# Patient Record
Sex: FEMALE | Race: WHITE | NOT HISPANIC OR LATINO | Employment: FULL TIME | URBAN - METROPOLITAN AREA
[De-identification: names, ages, dates, MRNs, and addresses within clinical notes are randomized per-mention and may not be internally consistent; named-entity substitution may affect disease eponyms.]

---

## 2021-01-28 ENCOUNTER — IMMUNIZATION (OUTPATIENT)
Dept: NURSING | Facility: CLINIC | Age: 36
End: 2021-01-28
Payer: COMMERCIAL

## 2021-01-28 PROCEDURE — 91300 PR COVID VAC PFIZER DIL RECON 30 MCG/0.3 ML IM: CPT

## 2021-01-28 PROCEDURE — 0001A PR COVID VAC PFIZER DIL RECON 30 MCG/0.3 ML IM: CPT

## 2021-02-18 ENCOUNTER — IMMUNIZATION (OUTPATIENT)
Dept: NURSING | Facility: CLINIC | Age: 36
End: 2021-02-18
Payer: COMMERCIAL

## 2021-02-18 PROCEDURE — 91300 PR COVID VAC PFIZER DIL RECON 30 MCG/0.3 ML IM: CPT

## 2021-02-18 PROCEDURE — 0002A PR COVID VAC PFIZER DIL RECON 30 MCG/0.3 ML IM: CPT

## 2021-03-07 ENCOUNTER — HEALTH MAINTENANCE LETTER (OUTPATIENT)
Age: 36
End: 2021-03-07

## 2021-10-11 ENCOUNTER — HEALTH MAINTENANCE LETTER (OUTPATIENT)
Age: 36
End: 2021-10-11

## 2022-03-27 ENCOUNTER — HEALTH MAINTENANCE LETTER (OUTPATIENT)
Age: 37
End: 2022-03-27

## 2022-09-25 ENCOUNTER — HEALTH MAINTENANCE LETTER (OUTPATIENT)
Age: 37
End: 2022-09-25

## 2023-05-08 ENCOUNTER — HEALTH MAINTENANCE LETTER (OUTPATIENT)
Age: 38
End: 2023-05-08

## 2023-12-08 ENCOUNTER — ANCILLARY PROCEDURE (OUTPATIENT)
Dept: ULTRASOUND IMAGING | Facility: CLINIC | Age: 38
End: 2023-12-08
Attending: STUDENT IN AN ORGANIZED HEALTH CARE EDUCATION/TRAINING PROGRAM
Payer: COMMERCIAL

## 2023-12-08 ENCOUNTER — NURSE TRIAGE (OUTPATIENT)
Dept: NURSING | Facility: CLINIC | Age: 38
End: 2023-12-08

## 2023-12-08 ENCOUNTER — HOSPITAL ENCOUNTER (EMERGENCY)
Facility: CLINIC | Age: 38
Discharge: HOME | End: 2023-12-08
Admitting: EMERGENCY MEDICINE
Payer: COMMERCIAL

## 2023-12-08 ENCOUNTER — HOSPITAL ENCOUNTER (EMERGENCY)
Facility: CLINIC | Age: 38
Discharge: HOME | End: 2023-12-08
Attending: STUDENT IN AN ORGANIZED HEALTH CARE EDUCATION/TRAINING PROGRAM | Admitting: STUDENT IN AN ORGANIZED HEALTH CARE EDUCATION/TRAINING PROGRAM
Payer: COMMERCIAL

## 2023-12-08 ENCOUNTER — TELEPHONE (OUTPATIENT)
Dept: FAMILY MEDICINE | Facility: CLINIC | Age: 38
End: 2023-12-08

## 2023-12-08 VITALS
SYSTOLIC BLOOD PRESSURE: 159 MMHG | DIASTOLIC BLOOD PRESSURE: 89 MMHG | OXYGEN SATURATION: 99 % | TEMPERATURE: 98.8 F | WEIGHT: 130 LBS | RESPIRATION RATE: 18 BRPM | BODY MASS INDEX: 21.66 KG/M2 | HEART RATE: 97 BPM | HEIGHT: 65 IN

## 2023-12-08 VITALS
WEIGHT: 147 LBS | DIASTOLIC BLOOD PRESSURE: 81 MMHG | HEART RATE: 86 BPM | RESPIRATION RATE: 16 BRPM | HEIGHT: 65 IN | BODY MASS INDEX: 24.49 KG/M2 | TEMPERATURE: 98.7 F | SYSTOLIC BLOOD PRESSURE: 136 MMHG | OXYGEN SATURATION: 100 %

## 2023-12-08 DIAGNOSIS — R22.0 LIP SWELLING: ICD-10-CM

## 2023-12-08 LAB
ANION GAP SERPL CALCULATED.3IONS-SCNC: 14 MMOL/L (ref 7–15)
BUN SERPL-MCNC: 12.6 MG/DL (ref 6–20)
CALCIUM SERPL-MCNC: 9.9 MG/DL (ref 8.6–10)
CHLORIDE SERPL-SCNC: 104 MMOL/L (ref 98–107)
CREAT SERPL-MCNC: 0.8 MG/DL (ref 0.51–0.95)
DEPRECATED HCO3 PLAS-SCNC: 24 MMOL/L (ref 22–29)
EGFRCR SERPLBLD CKD-EPI 2021: >90 ML/MIN/1.73M2
ERYTHROCYTE [DISTWIDTH] IN BLOOD BY AUTOMATED COUNT: 11.6 % (ref 10–15)
GLUCOSE SERPL-MCNC: 126 MG/DL (ref 70–99)
HCG SERPL QL: NEGATIVE
HCT VFR BLD AUTO: 36.7 % (ref 35–47)
HGB BLD-MCNC: 12.7 G/DL (ref 11.7–15.7)
MCH RBC QN AUTO: 30.6 PG (ref 26.5–33)
MCHC RBC AUTO-ENTMCNC: 34.6 G/DL (ref 31.5–36.5)
MCV RBC AUTO: 88 FL (ref 78–100)
PLATELET # BLD AUTO: 258 10E3/UL (ref 150–450)
POTASSIUM SERPL-SCNC: 3.3 MMOL/L (ref 3.4–5.3)
RBC # BLD AUTO: 4.15 10E6/UL (ref 3.8–5.2)
SODIUM SERPL-SCNC: 142 MMOL/L (ref 135–145)
WBC # BLD AUTO: 10.5 10E3/UL (ref 4–11)

## 2023-12-08 PROCEDURE — 99285 EMERGENCY DEPT VISIT HI MDM: CPT | Mod: 25

## 2023-12-08 PROCEDURE — 36415 COLL VENOUS BLD VENIPUNCTURE: CPT | Performed by: STUDENT IN AN ORGANIZED HEALTH CARE EDUCATION/TRAINING PROGRAM

## 2023-12-08 PROCEDURE — 85027 COMPLETE CBC AUTOMATED: CPT | Performed by: STUDENT IN AN ORGANIZED HEALTH CARE EDUCATION/TRAINING PROGRAM

## 2023-12-08 PROCEDURE — 76536 US EXAM OF HEAD AND NECK: CPT

## 2023-12-08 PROCEDURE — 80048 BASIC METABOLIC PNL TOTAL CA: CPT | Performed by: STUDENT IN AN ORGANIZED HEALTH CARE EDUCATION/TRAINING PROGRAM

## 2023-12-08 PROCEDURE — 250N000011 HC RX IP 250 OP 636: Mod: JZ | Performed by: STUDENT IN AN ORGANIZED HEALTH CARE EDUCATION/TRAINING PROGRAM

## 2023-12-08 PROCEDURE — 99282 EMERGENCY DEPT VISIT SF MDM: CPT | Mod: 25,27

## 2023-12-08 PROCEDURE — 96374 THER/PROPH/DIAG INJ IV PUSH: CPT | Mod: 59

## 2023-12-08 PROCEDURE — 250N000013 HC RX MED GY IP 250 OP 250 PS 637: Performed by: STUDENT IN AN ORGANIZED HEALTH CARE EDUCATION/TRAINING PROGRAM

## 2023-12-08 PROCEDURE — 64400 NJX AA&/STRD TRIGEMINAL NRV: CPT

## 2023-12-08 PROCEDURE — 96375 TX/PRO/DX INJ NEW DRUG ADDON: CPT | Mod: 59

## 2023-12-08 PROCEDURE — 84703 CHORIONIC GONADOTROPIN ASSAY: CPT | Performed by: STUDENT IN AN ORGANIZED HEALTH CARE EDUCATION/TRAINING PROGRAM

## 2023-12-08 RX ORDER — CEPHALEXIN 500 MG/1
500 CAPSULE ORAL 4 TIMES DAILY
Qty: 27 CAPSULE | Refills: 0 | Status: SHIPPED | OUTPATIENT
Start: 2023-12-08 | End: 2023-12-15

## 2023-12-08 RX ORDER — PREDNISONE 20 MG/1
TABLET ORAL
Qty: 10 TABLET | Refills: 0 | Status: SHIPPED | OUTPATIENT
Start: 2023-12-08

## 2023-12-08 RX ORDER — ETONOGESTREL AND ETHINYL ESTRADIOL VAGINAL RING .015; .12 MG/D; MG/D
RING VAGINAL
COMMUNITY
Start: 2023-09-01

## 2023-12-08 RX ORDER — METHYLPREDNISOLONE SODIUM SUCCINATE 125 MG/2ML
125 INJECTION, POWDER, LYOPHILIZED, FOR SOLUTION INTRAMUSCULAR; INTRAVENOUS ONCE
Status: COMPLETED | OUTPATIENT
Start: 2023-12-08 | End: 2023-12-08

## 2023-12-08 RX ORDER — CEPHALEXIN 500 MG/1
500 CAPSULE ORAL ONCE
Status: COMPLETED | OUTPATIENT
Start: 2023-12-08 | End: 2023-12-08

## 2023-12-08 RX ORDER — EPINEPHRINE 0.3 MG/.3ML
0.3 INJECTION SUBCUTANEOUS
Qty: 2 EACH | Refills: 0 | Status: SHIPPED | OUTPATIENT
Start: 2023-12-08

## 2023-12-08 RX ORDER — DIPHENHYDRAMINE HYDROCHLORIDE 50 MG/ML
50 INJECTION INTRAMUSCULAR; INTRAVENOUS ONCE
Status: COMPLETED | OUTPATIENT
Start: 2023-12-08 | End: 2023-12-08

## 2023-12-08 RX ADMIN — DIPHENHYDRAMINE HYDROCHLORIDE 50 MG: 50 INJECTION, SOLUTION INTRAMUSCULAR; INTRAVENOUS at 01:30

## 2023-12-08 RX ADMIN — CEPHALEXIN 500 MG: 500 CAPSULE ORAL at 02:46

## 2023-12-08 RX ADMIN — METHYLPREDNISOLONE SODIUM SUCCINATE 125 MG: 125 INJECTION, POWDER, FOR SOLUTION INTRAMUSCULAR; INTRAVENOUS at 01:36

## 2023-12-08 ASSESSMENT — ENCOUNTER SYMPTOMS
TROUBLE SWALLOWING: 0
SHORTNESS OF BREATH: 0

## 2023-12-08 ASSESSMENT — ACTIVITIES OF DAILY LIVING (ADL): ADLS_ACUITY_SCORE: 35

## 2023-12-08 NOTE — ED TRIAGE NOTES
Pt presents with concerns due to changes in character of lip swelling. Was seen this morning. Lip swelling has stayed the same but her lip is now white and feels calloused. Denies any increase in swelling.      Triage Assessment (Adult)       Row Name 12/08/23 2439          Triage Assessment    Airway WDL WDL        Respiratory WDL    Respiratory WDL WDL        Skin Circulation/Temperature WDL    Skin Circulation/Temperature WDL WDL        Cardiac WDL    Cardiac WDL WDL        Peripheral/Neurovascular WDL    Peripheral Neurovascular WDL WDL        Cognitive/Neuro/Behavioral WDL    Cognitive/Neuro/Behavioral WDL WDL

## 2023-12-08 NOTE — ED PROVIDER NOTES
NAME: Sarah Harrison  AGE: 38 year old female  YOB: 1985  MRN: [id number]  EVALUATION DATE & TIME: 12/8/2023  1:15 AM    PCP: [facility]  ED PROVIDER: Bhargavi Oliver MD.    Chief Complaint   Patient presents with    Oral Swelling       FINAL IMPRESSION:  1. Lip swelling        MEDICAL DECISION MAKING:      MDM: 39 y/o F who presents with lip swelling. Developed a pimple/sore to her left lower lip that progressed to focal area of swelling this evening. Her vitals are reassuring and she is nontoxic appearing. She was given benadryl, steroids, antibiotics. Ultrasound and exam is not suggestive of abscess. Lab work is generally reassuring, mild hypokalemia noted.  I do not suspect sepsis or feel warrants CT imaging at this time. I considered anaphylaxis or angioedema - no new medications, ace/arb, no other symptoms and exam and presentation seems atypical for this given how localized it is (only part of the lower lip and started with a sore/bump). Will send with an epi pen out of abundance of caution. Plan to start on antibiotics and steroids with close follow up early next week for reassessment. Strict return precautions discussed and patient is in agreement with plan, endorses understanding and her questions were answered.    Medical Decision Making    History:  Supplemental history from: Documented in chart, if applicable  External Record(s) reviewed: Documented in chart, if applicable.    Work Up:  Chart documentation includes differential considered and any EKGs or imaging interpreted by provider.  In additional to work up documented, I considered the following work up: Documented in chart, if applicable.    External consultation:  Discussion of management with another provider: Documented in chart, if applicable    Complicating factors:  Care impacted by chronic illness: N/A  Care affected by social determinants of health: N/A    Disposition considerations: Discharge. I prescribed  additional prescription strength medication(s) as charted. I considered admission, but ultimately discharged patient given reassuring vitals, labs.      MEDICATIONS GIVEN IN THE EMERGENCY:  Medications   diphenhydrAMINE (BENADRYL) injection 50 mg (50 mg Intravenous $Given 12/8/23 0130)   methylPREDNISolone sodium succinate (solu-MEDROL) injection 125 mg (125 mg Intravenous $Given 12/8/23 0136)   cephALEXin (KEFLEX) capsule 500 mg (500 mg Oral $Given 12/8/23 0246)       NEW PRESCRIPTIONS STARTED AT TODAY'S ER VISIT:  Discharge Medication List as of 12/8/2023  3:01 AM        START taking these medications    Details   cephALEXin (KEFLEX) 500 MG capsule Take 1 capsule (500 mg) by mouth 4 times daily for 7 days, Disp-27 capsule, R-0, Local Print      EPINEPHrine (ANY BX GENERIC EQUIV) 0.3 MG/0.3ML injection 2-pack Inject 0.3 mLs (0.3 mg) into the muscle once as needed for anaphylaxis May repeat one time in 5-15 minutes if response to initial dose is inadequate., Disp-2 each, R-0, Local Print      predniSONE (DELTASONE) 20 MG tablet Take two tablets (= 40mg) each day for 5 (five) days, Disp-10 tablet, R-0, Local Print              =================================================================  HPI    Patient information was obtained from: patient and mother  Use of : N/A      Sarah RENNY Harrison is a 38 year old female who presents with lip swelling. Recently noticed a sore/pimple to her left lower lip. This developed this evening into increased swelling and tingling. No new oral or topical medications. No h/o of allergic reaction. Is not on an ace/arb. No tongue swelling, throat tightness, chest pain, difficulty breathing or swallowing, vomiting, abdominal pain, diarrhea or other symptoms. She did have lip fillers in August of this year at a [facility].     PAST MEDICAL HISTORY:  No past medical history on file.    PAST SURGICAL HISTORY:  No past surgical history on file.    CURRENT MEDICATIONS:    No current  "facility-administered medications for this encounter.    Current Outpatient Medications:     cephALEXin (KEFLEX) 500 MG capsule, Take 1 capsule (500 mg) by mouth 4 times daily for 7 days, Disp: 27 capsule, Rfl: 0    EPINEPHrine (ANY BX GENERIC EQUIV) 0.3 MG/0.3ML injection 2-pack, Inject 0.3 mLs (0.3 mg) into the muscle once as needed for anaphylaxis May repeat one time in 5-15 minutes if response to initial dose is inadequate., Disp: 2 each, Rfl: 0    etonogestrel-ethinyl estradiol (NUVARING) 0.12-0.015 MG/24HR vaginal ring, Insert 1 ring vaginally and leave in place for 3 consecutive weeks, then remove for 1 week. Repeat with new ring., Disp: , Rfl:     predniSONE (DELTASONE) 20 MG tablet, Take two tablets (= 40mg) each day for 5 (five) days, Disp: 10 tablet, Rfl: 0    ALLERGIES:  No Known Allergies    FAMILY HISTORY:  No family history on file.    SOCIAL HISTORY:   Social History     Socioeconomic History    Marital status: Single       PHYSICAL EXAM:    Vitals: /81   Pulse 86   Temp 98.7  F (37.1  C) (Oral)   Resp 16   Ht 1.651 m (5' 5\")   Wt 66.7 kg (147 lb)   SpO2 100%   BMI 24.46 kg/m     Constitutional: Well developed, well nourished.  HENT: Localized swelling to the left lower lateral lip with small open wound anteriorly. Floor of both/gums without swelling. Head, normocephalic, atraumatic. Neck-gross ROM intact.   Eyes: Pupils mid-range, sclera white  Respiratory: no respiratory distress  Cardiovascular: Normal heart rate  Musculoskeletal: Moving extremities intentionally and without pain. No obvious deformity.  Skin: Warm, dry, no rash.  Neurologic: Alert & oriented, speech clear, Cns grossly intact, no focal deficits noted    LAB:  All pertinent labs reviewed and interpreted.  Labs Ordered and Resulted from Time of ED Arrival to Time of ED Departure   BASIC METABOLIC PANEL - Abnormal       Result Value    Sodium 142      Potassium 3.3 (*)     Chloride 104      Carbon Dioxide (CO2) 24      " Anion Gap 14      Urea Nitrogen 12.6      Creatinine 0.80      GFR Estimate >90      Calcium 9.9      Glucose 126 (*)    CBC WITH PLATELETS - Normal    WBC Count 10.5      RBC Count 4.15      Hemoglobin 12.7      Hematocrit 36.7      MCV 88      MCH 30.6      MCHC 34.6      RDW 11.6      Platelet Count 258     HCG QUALITATIVE PREGNANCY - Normal    hCG Serum Qualitative Negative         EKG:   N/A    PROCEDURES:   POC US SOFT TISSUE    Date/Time: 12/8/2023 3:48 AM    Performed by: Bhargavi Oliver MD  Authorized by: Bhargavi Oliver MD    Procedure Details & Findings:      PROCEDURE PROVIDER:   Bhargavi Oliver  FINDINGS: Some cobblestoning. No abscess or fluid collection seen  IMAGES SAVED AND STORED FOR ARCHIVE AND REVIEW: Yes       PROCEDURE: Dental Nerve Block   INDICATIONS: Dental pain   PROCEDURE PROVIDER: Dr Bhargavi Oliver   SITE: Left mental nerve     MEDICATION: 3 mL of 1% Lidocaine without epinephrine   NOTE: The usual landmarks were identified and the needle was positioned intraorally near 2nd premolar tooth.  Area was aspirated and there was no return of blood.  I then injected the medication into the site.    COMPLICATIONS: Patient tolerated procedure well, without complication       Bhargavi Oliver M.D.  Emergency Medicine  [facility] EMERGENCY ROOM  [address] [id number] [phone]  Dept: [phone]       Bhargavi Oliver MD  12/08/23 0357

## 2023-12-08 NOTE — ED NOTES
"Pt presents to ED with lower lip swelling that started yesterday late afternoon, around 1600.  Pt took 4  benadryl around that time.  Notes she had a \"pimple\" to the area and was picking at it.  Had some swelling at that time, but it was \"just a little bit\".  Denies any throat swelling or tightness.  States her lip feels harder now.   "

## 2023-12-08 NOTE — DISCHARGE INSTRUCTIONS
Please take antibiotics and steroids as prescribed   Follow up closely with a primary care doctor ideally on Monday for reassessment, I would start with them instead of a specialist, they can refer you if needed  If you have significant increased swelling, tongue/throat swelling, difficulty breathing or swallowing take the epi pen and call 911/return to the Emergency Room. Also return with fevers

## 2023-12-08 NOTE — TELEPHONE ENCOUNTER
Nurse Triage SBAR    Is this a 2nd Level Triage? NO    Situation: New Lip numbness and lip color change    Background: Pt. Was seen in [facility] ER last night for Lip sore/swelling.   Pt. Has taken 1 dose of prescribed Keflex, next dose of oral steroid not due until Sat. Am as stteroid was started in  ER. Pt. Reports skin color has changed to pale/brown.     Assessment: Pt. Denies pain and further lip swelling. Denies difficulty breathing, tongue swelling or hives. Protocol Recommended Disposition:   Go to ED Now (Or PCP Triage), See More Appropriate Guideline    Recommendation: Triager reviewed indications for epi-pen use and if symptoms progress en route to ER              Reason for Disposition   Patient sounds very sick or weak to the triager     Triager unsure if possibility could be a delayed reaction to nerve block/Lidocaine, pt. Notes new brown and pale color, non specific description of site. She denied pain, but did state area is more Numb now than when administered   Lip swelling is main symptom    Additional Information   Negative: Unresponsive, passed out or very weak   Negative: Swollen tongue   Negative: Difficulty breathing or wheezing   Negative: [1] Life-threatening reaction in the past to similar substance (e.g., food, insect bite/sting, chemical, etc.) AND [2] < 2 hours since exposure   Negative: Sounds like a life-threatening emergency to the triager   Negative: Taking an ACE Inhibitor medicine (e.g., benazepril / LOTENSIN, captopril / CAPOTEN, enalapril / VASOTEC, lisinopril / ZESTRIL)   Negative: [1] Severe swelling AND [2] cause unknown     Seen in Er at 0100 and evaluated, no increase in swelling now in comparison    Protocols used: Allergic Reactions - Guideline [id number], Lip Swelling-A-

## 2023-12-08 NOTE — TELEPHONE ENCOUNTER
Reason for call:  Other   Patient called regarding (reason for call):   ED follow up 1- 2 Days for Lip Swelling / Establish care     Additional comments:   PT seeking any available provider at [facility] but [facility], [facility], [facility], [facility] are ok too. Please reach out to patient and thank you.    Phone number to reach patient:  Home number on file [phone] (home)    Best Time:  any    Can we leave a detailed message on this number?  YES    Travel screening: Not Applicable

## 2023-12-08 NOTE — ED TRIAGE NOTES
Patient here for lower lip swelling that started tonight. Patient reports she had a pimple in that area and started to pick the area tonight. Swelling got worse and spread throughout the entire lip. Denies allergens or blood pressure medication use. Also reports throat tightness. No signs of respiratory distress.      Triage Assessment (Adult)       Row Name 12/08/23 0119          Triage Assessment    Airway WDL WDL        Respiratory WDL    Respiratory WDL WDL        Skin Circulation/Temperature WDL    Skin Circulation/Temperature WDL X  swelling to lower lip        Cardiac WDL    Cardiac WDL WDL        Peripheral/Neurovascular WDL    Peripheral Neurovascular WDL WDL        Cognitive/Neuro/Behavioral WDL    Cognitive/Neuro/Behavioral WDL WDL

## 2023-12-09 NOTE — ED NOTES
Called for pt in lobby three separate times. No response from lobby. Pt presumed to have left prior to received discharge instructions from RN.

## 2023-12-09 NOTE — DISCHARGE INSTRUCTIONS
You were seen here today for evaluation of your lip swelling.  Your lip appears to still have very good blood flow which is our main concern with any discoloration.    Continue taking the Keflex and prednisone as previously prescribed.  I would also recommend applying a cool compress to your lip to help with the swelling.    Use the epipen if you have worsening swelling, difficulty breathing, vomiting. You should always come to the er if you need to use an epipen.     Return here for any new or worsening symptoms otherwise follow up with your primary care provider as planned.

## 2023-12-09 NOTE — ED PROVIDER NOTES
EMERGENCY DEPARTMENT ENCOUNTER      NAME: Sarah Harrison  AGE: 38 year old female  YOB: 1985  MRN: [id number]  EVALUATION DATE & TIME: No admission date for patient encounter.    PCP: [facility]    ED PROVIDER: Onelia Valentin PA-C      Chief Complaint   Patient presents with    Oral Swelling         FINAL IMPRESSION:  1. Lip swelling          ED COURSE & MEDICAL DECISION MAKING:    Pertinent Labs & Imaging studies reviewed. (See chart for details)    38 year old female presents to the Emergency Department for evaluation of lip swelling.    Physical exam is remarkable for a generally well-appearing female who is in no acute distress.  She has swelling of her left lower lip, there is some white demarcation on the upper part of the lower lip but capillary refill is less than 1 second in the entirety of the lip.  She has normal range of motion of the lip, no trismus.  No swelling on the floor of the mouth or the tongue.  Normal range of motion of the neck.  Vital signs are stable and she is afebrile.    I do not think any emergent labs or imaging are indicated at this time.  The patient is hemodynamically stable here and generally well-appearing on exam.  She believes her lip swelling is actually improved slightly compared to her previous visit but was concerned about the discoloration, I advised her that she is neurovascularly intact so I do not have concern about the discoloration and there is no clinical evidence of vascular compromise. Patient was hopeful that we could possibly drain the area but there is no focal fluid collection/abscess that would be amenable to drainage. No evidence of anaphylaxis or significant allergic reaction at this point.  Advised her to continue the prednisone and Keflex at home, also discussed cool compresses. Discussed indications to use the epipen. Recommend Tylenol or Advil as well.  Advised to return here for any new or worsening symptoms otherwise  follow-up in primary care clinic as planned.  Patient is agreeable with this treatment plan and verbalized understanding.    Medical Decision Making    History:  Supplemental history from: Documented in chart, if applicable  External Record(s) reviewed: Outpatient Record: ER visit earlier today    Work Up:  Chart documentation includes differential considered and any EKGs or imaging independently interpreted by provider, where specified.  In additional to work up documented, I considered the following work up: Imaging CT, but deferred due to no clinical decline.    External consultation:  Discussion of management with another provider: Documented in chart, if applicable    Complicating factors:  Care impacted by chronic illness: N/A  Care affected by social determinants of health: N/A    Disposition considerations: Discharge. I recommended the patient continue their current prescription strength medication(s): keflex and prednisone. N/A.    ED Course   6:40 PM Performed my initial history and physical exam. Discussed workup in the emergency department, management of symptoms, and likely disposition. I discussed the plan for discharge with the patient or family and they are agreeable.. We discussed supportive cares at home and reasons for return to the ER including new or worsening symptoms - all questions and concerns addressed. Patient to be discharged by RN.    At the conclusion of the encounter I discussed the results of all of the tests and the disposition. The questions were answered. The patient or family acknowledged understanding and was agreeable with the care plan.     Voice recognition software was used in the creation of this note. Any grammatical or nonsensical errors are due to inherent errors with the software and are not the intention of the writer.     MEDICATIONS GIVEN IN THE EMERGENCY:  Medications - No data to display    NEW PRESCRIPTIONS STARTED AT TODAY'S ER VISIT  New Prescriptions    No  medications on file            =================================================================    HPI    Patient information was obtained from: patient     Use of : N/A         Sarah Harrison is a 38 year old female who presents to the ED for evaluation of lip swelling.     Per chart review,   Patient was seen in the  ED earlier today for left lower lip swelling. She was given benadryl, steroids, antibiotics. Ultrasound and exam not suggestive of abscess. Lab work notable for mild hypokalemia, but was otherwise reassuring. Discharged with course of Keflex and prednisone. Also prescribed an epi-pen.     The patient returns to the ED for ongoing lower left lip swelling. Previously the area was more red, but now it is closer to a white-pink color. The swelling has improved a bit. She has ongoing pain to the area. She has taken the antibiotic and prednisone since she was last seen in the ED. She was concerned about the change in coloration with her ongoing swelling and pain. She is hoping to have the area drained. No associated swelling to her tongue or floor of mouth. No associated throat swelling or shortness of breath. No other reported complaints or concerns at this time. She had upper and lower lip fillers placed in August.       REVIEW OF SYSTEMS   Review of Systems   HENT:  Negative for trouble swallowing.         Positive for left lower lip swelling, discoloration, and pain. No tongue/floor of mouth swelling.     Respiratory:  Negative for shortness of breath.      All other systems reviewed and are negative unless noted in HPI.      PAST MEDICAL HISTORY:  History reviewed. No pertinent past medical history.    PAST SURGICAL HISTORY:  History reviewed. No pertinent surgical history.    CURRENT MEDICATIONS:    cephALEXin (KEFLEX) 500 MG capsule  EPINEPHrine (ANY BX GENERIC EQUIV) 0.3 MG/0.3ML injection 2-pack  etonogestrel-ethinyl estradiol (NUVARING) 0.12-0.015 MG/24HR vaginal ring  predniSONE  "(DELTASONE) 20 MG tablet        ALLERGIES:  No Known Allergies    FAMILY HISTORY:  History reviewed. No pertinent family history.    SOCIAL HISTORY:   Social History     Socioeconomic History    Marital status: Single       VITALS:  Patient Vitals for the past 24 hrs:   BP Temp Temp src Pulse Resp SpO2 Height Weight   12/08/23 1651 (!) 159/89 98.8  F (37.1  C) Oral 97 18 99 % 1.651 m (5' 5\") 59 kg (130 lb)       PHYSICAL EXAM    VITAL SIGNS: BP (!) 159/89   Pulse 97   Temp 98.8  F (37.1  C) (Oral)   Resp 18   Ht 1.651 m (5' 5\")   Wt 59 kg (130 lb)   SpO2 99%   BMI 21.63 kg/m    General Appearance: Alert, cooperative, normal speech and facial symmetry, appears stated age, the patient does not appear in distress  Head:  Normocephalic, without obvious abnormality, atraumatic  Eyes: Conjunctiva/corneas clear, EOM's intact, no nystagmus, PERRL  ENT: Swelling of the eft lower lip with a small wound on the medial left lower lip, there is some white demarcation on the upper part of the lower lip but capillary refill is less than 1 second in the entirety of the lip.  She has normal range of motion of the lip, no trismus.  No swelling on the floor of the mouth or the tongue. Normal upper and lower left gingivae.  Neuro: Patient is awake, alert, and responsive to voice. No gross motor weaknesses or sensory loss; moves all extremities.    LAB:  All pertinent labs reviewed and interpreted.  Labs Ordered and Resulted from Time of ED Arrival to Time of ED Departure - No data to display    RADIOLOGY:  Reviewed all pertinent imaging. Please see official radiology report.  No orders to display         ISamantha, am serving as a scribe to document services personally performed by Onelia Valentin PA-C based on my observation and the provider's statements to me. IOnelia PA-C attest that Samantha Kidd is acting in a scribe capacity, has observed my performance of the services and has documented them in " accordance with my direction.     Onelia Valentin PA-C  Emergency Medicine  [facility] EMERGENCY ROOM  [address] [address]  [phone]  Dept: [phone]       Onelia Valentin PA-C  12/08/23 3044

## 2023-12-11 ENCOUNTER — OFFICE VISIT (OUTPATIENT)
Dept: FAMILY MEDICINE | Facility: CLINIC | Age: 38
End: 2023-12-11
Payer: COMMERCIAL

## 2023-12-11 VITALS
OXYGEN SATURATION: 100 % | HEART RATE: 63 BPM | WEIGHT: 132.7 LBS | TEMPERATURE: 99.1 F | BODY MASS INDEX: 21.33 KG/M2 | DIASTOLIC BLOOD PRESSURE: 82 MMHG | HEIGHT: 66 IN | SYSTOLIC BLOOD PRESSURE: 120 MMHG | RESPIRATION RATE: 12 BRPM

## 2023-12-11 DIAGNOSIS — L01.00 IMPETIGO: Primary | ICD-10-CM

## 2023-12-11 DIAGNOSIS — R22.0 LIP SWELLING: ICD-10-CM

## 2023-12-11 PROCEDURE — 99203 OFFICE O/P NEW LOW 30 MIN: CPT | Performed by: FAMILY MEDICINE

## 2023-12-11 ASSESSMENT — PAIN SCALES - GENERAL: PAINLEVEL: NO PAIN (0)

## 2023-12-11 NOTE — PROGRESS NOTES
"  Assessment & Plan   Problem List Items Addressed This Visit    None  Visit Diagnoses       Impetigo    -  Primary    Lip swelling               Exam to be most consistent with impetigo.  Healing.  No evidence of herpetic lesions at this time.  Need to monitor closely.  Continue on antibiotics.  If ongoing issues or new concerns, may need follow-up with dermatology or ENT.           MED REC REQUIRED  Post Medication Reconciliation Status: discharge medications reconciled, continue medications without change      STACIA ANN MD  [facility] AKI Smith is a 38 year old, presenting for the following health issues:  Hospital F/U (Lip swelling on 12/8/23 at [facility]. Pt states she felt like she had a pimple on her lower lip and by the time she got to the ER her lip had ballooned up. Pt now has scabs and reports her lip is tight and painful. Pt has never had anything like this before. ) and Establish Care (Establishing care from [facility]. )        12/11/2023     3:43 PM   Additional Questions   Roomed by Ani LAWSON CMA       HPI             Review of Systems   Constitutional, HEENT, cardiovascular, pulmonary, gi and gu systems are negative, except as otherwise noted.      Objective    /82 (BP Location: Left arm, Patient Position: Sitting, Cuff Size: Adult Regular)   Pulse 63   Temp 99.1  F (37.3  C) (Oral)   Resp 12   Ht 1.676 m (5' 6\")   Wt 60.2 kg (132 lb 11.2 oz)   LMP 11/20/2023 (Within Days)   SpO2 100%   Breastfeeding No   BMI 21.42 kg/m    Body mass index is 21.42 kg/m .  Physical Exam   GENERAL: healthy, alert and no distress  HENT: normal cephalic/atraumatic, nose and mouth without ulcers or lesions, oropharynx clear, oral mucous membranes moist, and evaluation of lower lip reveals scabbing on the left lateral side with a few pustular lesions.  No clear vesicles noted.  NECK: no adenopathy, no asymmetry, masses, or scars and thyroid normal to " palpation  RESP: lungs clear to auscultation - no rales, rhonchi or wheezes  CV: regular rate and rhythm, normal S1 S2, no S3 or S4, no murmur, click or rub, no peripheral edema and peripheral pulses strong

## 2024-08-02 ENCOUNTER — PATIENT OUTREACH (OUTPATIENT)
Dept: CARE COORDINATION | Facility: CLINIC | Age: 39
End: 2024-08-02
Payer: COMMERCIAL

## 2024-08-16 ENCOUNTER — PATIENT OUTREACH (OUTPATIENT)
Dept: CARE COORDINATION | Facility: CLINIC | Age: 39
End: 2024-08-16
Payer: COMMERCIAL

## 2024-12-01 ENCOUNTER — HEALTH MAINTENANCE LETTER (OUTPATIENT)
Age: 39
End: 2024-12-01